# Patient Record
(demographics unavailable — no encounter records)

---

## 2025-01-23 NOTE — HISTORY OF PRESENT ILLNESS
[de-identified] : ISH WATKINS is a 77 year old male here referred by Dr. Burr for evaluation of newly diagnosed Stage II right colon cancer.  Oncology history:  --presentation:  fatigue, change in bowel habits, unintentional weight loss of 5 lbs x 1 month, denies rectal bleeding or pain, saw PCP Dr. Cesar Mercer and found to have Hgb of 7 and received 2 units of RBC transfusion at [place] [facility] --colonoscopy at [facility] on 10/25/2023 : a polypoid non-obstructing large mass was found in the cecum. The mass was partially circumferential (involving two-thirds of the lumen circumference). No bleeding was present. Biopsied. non-bleeding internal hemorrhoids found during retroflexion. Mild hemorrhoids.  Biopsy of cecum mass  showed Invasive moderately differentiated adenocarcinoma. loss of MLH1 and PMS2,  MSH2 and MSH6 intact, HER2 -ve --CT C/A/P at [place] on 10/30/23 showed several small low attenuation liver lesions, too small to characterize, enlarged prostate, large portion of the colon is decompressed. Focal narrowing along the splenic flexure, likely represents peristalsis, infrarenal abdominal aorta w/ atherosclerotic plaque measuring up to 3cm. No distant metastasis  --pre op CEA <1.8 on10/31/23 --seen by Dr. Burr on 11/8/23 and underwent Robotic Right Hemicolectomy 11/16/23, pathology showed invasive adenocarcinoma, moderate to poorly differentiated, all margins negative for carcinoma, 0/35 LN, Foci suspicious for LVI, pT3pN0,  loss of MLH1 and PMS2,  MSH2 and MSH6 intact,  BRAF mutation positive.   PMH:  no significant except for lumbar laminectomy syndrome PSH: spinal surgery with placement of percutaneous spinal cord stimulator 5/14/2021 by Alejandro Tsai, nose surgery in 1969 FH: brother with colon cancer (dx in 50s) SH : non smoker, social alcohol use, lives with his wife, retired.  1/13/25: CT C/A/P- No evidence of recurrence or metastasis.   [de-identified] : Here for follow up. He is feeling well. No changes in bowel movements. He mentioned having 2 BM's a day, which has been ongoing after the surgery. No new changes. He also reported excessive flatulence. He denies nausea, vomiting, constipation, diarrhea, dizziness, or abdominal pain.  Yes (1)

## 2025-01-23 NOTE — ASSESSMENT
[FreeTextEntry1] : Ernie Santamaria is a 77 year old man who presented w/ iron deficiency anemia and change in bowel habits.  Colonoscopy revealed a malignant-appearing mass in the cecum.  Biopsy confirmed adenocarcinoma.  Loss of MLH-1 and PMS2 by IHC;  MSH-2 and MSH-6 intact.  CT c/a/p without any findings for metastatic disease;  pre-op CEA normal at 1.8.  He underwent a robotic assisted R hemicolectomy on 11/16/23.  Pathology showed invasive adenocarcinoma of the cecum measuring 6.6 cm.  Moderate/poorly differentiated.   Lymphovascular invasion identified histologically;  no perineural invasion.  0/35 lymph nodes involved by the cancer.  [id number] (stage IIA).  loss of MLH1 and PMS2,  MSH2 and MSH6 intact,  BRAF mutation positive.   Plan: 1. 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  --prognosis discussed.  This is a very curable cancer. --adjuvant chemotherapy not indicated for his stage and subtype (deficient mismatch repair protein) colon cancer.  Per NCCN guidelines. --BRAF mutation positive, which would indicate a sporadic dMMR cancer, no need for genetic counseling --surveillance schedule for resected stage IIA colon cancer.  He should have labs + examination approximately every 6 months with annual CT c/a/p.  Colonoscopy was due at 1 year (10/2024), now rescheduled for 1/31/25  and then at intervals defined by the findings on the colonoscopy. --labs reviewed, CEA normal reassuring. He is clinically doing well. --Colonoscopy is scheduled for 1/31/25. --CT c/a/p in1/13/25--No recurrence or mets. -- Continue active surveillance   2.  iron deficiency anemia - resolved  --CBC, iron panel, ferritin normal - 79 , %Sat 31%  3. flavio maintenance  --eat well balanced diet, exercise regularly  --maintain f/u with PCP   RTC after 7/23/25 labs- CMP, CBC, CEA

## 2025-01-23 NOTE — REVIEW OF SYSTEMS
[Abdominal Pain] : no abdominal pain [Vomiting] : no vomiting [Constipation] : no constipation [Negative] : Endocrine [de-identified] : mild balance issue, ambulate w/ cane ( not daily)

## 2025-01-23 NOTE — PHYSICAL EXAM
[Fully active, able to carry on all pre-disease performance without restriction] : Status 0 - Fully active, able to carry on all pre-disease performance without restriction [Normal] : affect appropriate [de-identified] : soft, non tender,

## 2025-01-23 NOTE — ASSESSMENT
[FreeTextEntry1] : Ernie Santamaria is a 77 year old man who presented w/ iron deficiency anemia and change in bowel habits.  Colonoscopy revealed a malignant-appearing mass in the cecum.  Biopsy confirmed adenocarcinoma.  Loss of MLH-1 and PMS2 by IHC;  MSH-2 and MSH-6 intact.  CT c/a/p without any findings for metastatic disease;  pre-op CEA normal at 1.8.  He underwent a robotic assisted R hemicolectomy on 11/16/23.  Pathology showed invasive adenocarcinoma of the cecum measuring 6.6 cm.  Moderate/poorly differentiated.   Lymphovascular invasion identified histologically;  no perineural invasion.  0/35 lymph nodes involved by the cancer.  [id number] (stage IIA).  loss of MLH1 and PMS2,  MSH2 and MSH6 intact,  BRAF mutation positive.   Plan: 1. 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  --prognosis discussed.  This is a very curable cancer. --adjuvant chemotherapy not indicated for his stage and subtype (deficient mismatch repair protein) colon cancer.  Per NCCN guidelines. --BRAF mutation positive, which would indicate a sporadic dMMR cancer, no need for genetic counseling --surveillance schedule for resected stage IIA colon cancer.  He should have labs + examination approximately every 6 months with annual CT c/a/p.  Colonoscopy was due at 1 year (10/2024), now rescheduled for 1/31/25  and then at intervals defined by the findings on the colonoscopy. --labs reviewed, CEA normal reassuring. He is clinically doing well. --Colonoscopy is scheduled for 1/31/25. --CT c/a/p in1/13/25--No recurrence or mets. -- Continue active surveillance   2.  iron deficiency anemia - resolved  --CBC, iron panel, ferritin normal - 79 , %Sat 31%  3. flavio maintenance  --eat well balanced diet, exercise regularly  --maintain f/u with PCP   RTC after 7/23/25 labs- CMP, CBC, CEA

## 2025-01-23 NOTE — HISTORY OF PRESENT ILLNESS
[de-identified] : ISH WATKINS is a 77 year old male here referred by Dr. Burr for evaluation of newly diagnosed Stage II right colon cancer.  Oncology history:  --presentation:  fatigue, change in bowel habits, unintentional weight loss of 5 lbs x 1 month, denies rectal bleeding or pain, saw PCP Dr. Cesar Mercer and found to have Hgb of 7 and received 2 units of RBC transfusion at [place] [facility] --colonoscopy at [facility] on 10/25/2023 : a polypoid non-obstructing large mass was found in the cecum. The mass was partially circumferential (involving two-thirds of the lumen circumference). No bleeding was present. Biopsied. non-bleeding internal hemorrhoids found during retroflexion. Mild hemorrhoids.  Biopsy of cecum mass  showed Invasive moderately differentiated adenocarcinoma. loss of MLH1 and PMS2,  MSH2 and MSH6 intact, HER2 -ve --CT C/A/P at [place] on 10/30/23 showed several small low attenuation liver lesions, too small to characterize, enlarged prostate, large portion of the colon is decompressed. Focal narrowing along the splenic flexure, likely represents peristalsis, infrarenal abdominal aorta w/ atherosclerotic plaque measuring up to 3cm. No distant metastasis  --pre op CEA <1.8 on10/31/23 --seen by Dr. Burr on 11/8/23 and underwent Robotic Right Hemicolectomy 11/16/23, pathology showed invasive adenocarcinoma, moderate to poorly differentiated, all margins negative for carcinoma, 0/35 LN, Foci suspicious for LVI, pT3pN0,  loss of MLH1 and PMS2,  MSH2 and MSH6 intact,  BRAF mutation positive.   PMH:  no significant except for lumbar laminectomy syndrome PSH: spinal surgery with placement of percutaneous spinal cord stimulator 5/14/2021 by Alejandro Tsai, nose surgery in 1969 FH: brother with colon cancer (dx in 50s) SH : non smoker, social alcohol use, lives with his wife, retired.  1/13/25: CT C/A/P- No evidence of recurrence or metastasis.   [de-identified] : Here for follow up. He is feeling well. No changes in bowel movements. He mentioned having 2 BM's a day, which has been ongoing after the surgery. No new changes. He also reported excessive flatulence. He denies nausea, vomiting, constipation, diarrhea, dizziness, or abdominal pain.  Plan: Recommend using back applicator to apply moisturizer Detail Level: Detailed

## 2025-01-23 NOTE — PHYSICAL EXAM
[Fully active, able to carry on all pre-disease performance without restriction] : Status 0 - Fully active, able to carry on all pre-disease performance without restriction [Normal] : affect appropriate [de-identified] : soft, non tender,

## 2025-01-23 NOTE — REVIEW OF SYSTEMS
[Abdominal Pain] : no abdominal pain [Vomiting] : no vomiting [Constipation] : no constipation [Negative] : Endocrine [de-identified] : mild balance issue, ambulate w/ cane ( not daily)

## 2025-07-22 NOTE — END OF VISIT
[] : Fellow [FreeTextEntry3] :  I evaluated the patient with the Oncology fellow, Dr Liz Canada.  The patient presents for f/u of stage IIA colon cancer.  No new symptoms or problems to report.  Exam as above per Dr Canada's note Labs reviewed.  CBC, CMP, CEA reassuring.  Plan #  colon cancer - no concerns by history or physical examination at this visit. --discussed CT c/a/p results. No findings for recurrence/metastasis.   --due for colonoscopy which is scheduled at the end of this month --return precautions reviewed.  # enlarged prostate - noted on CT - advised pt to d/w PCP or urologist.  RTC 6 months

## 2025-07-28 NOTE — PHYSICAL EXAM
[Fully active, able to carry on all pre-disease performance without restriction] : Status 0 - Fully active, able to carry on all pre-disease performance without restriction [Normal] : affect appropriate [de-identified] : soft, non tender,

## 2025-07-28 NOTE — ASSESSMENT
[FreeTextEntry1] : Ernie Santamaria is a 78 year old man who presented w/ iron deficiency anemia and change in bowel habits.  Colonoscopy revealed a malignant-appearing mass in the cecum.  Biopsy confirmed adenocarcinoma.  Loss of MLH-1 and PMS2 by IHC;  MSH-2 and MSH-6 intact.  CT c/a/p without any findings for metastatic disease;  pre-op CEA normal at 1.8.  He underwent a robotic assisted R hemicolectomy on 11/16/23.  Pathology showed invasive adenocarcinoma of the cecum measuring 6.6 cm.  Moderate/poorly differentiated.   Lymphovascular invasion identified histologically;  no perineural invasion.  0/35 lymph nodes involved by the cancer.  [id number] (stage IIA).  loss of MLH1 and PMS2, MSH2 and MSH6 intact.  BRAF V600E mutation positive (indicative of sporadic, not germline, mismatch repair deficiency).  No adjuvant therapy.  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 Prognosis was also discussed.  This is a very curable cancer.   Adjuvant chemotherapy was not indicated for his stage and subtype (deficient mismatch repair protein) colon cancer, per the NCCN guidelines.  The patient entered surveillance. --CBC/CMP/CEA reviewed, reassuring. He is clinically doing well, no new symptoms.  --CT c/a/p results in 1/2025. No findings for recurrence/metastasis. We will repeat CT c/a/p in 1/2026. He prefers going to [facility], requisition provided. He will call for scheduling.  --he reports he had a colonoscopy with Dr. Mercer earlier this year and was told that everything was ok. Will request the results.  --return precaution reviewed. Continue active surveillance   # enlarged prostate - noted on CT in 1/2025 --PSA is usually around 2 but annual PSA in June went up to 6 as per pt, he will repeat PSA with PCP next week --has a follow up with urology in September. Advised him to see urology sooner if PSA rising rapidly.   # flavio maintenance  --eat well balanced diet, exercise regularly, good PO hydration  --maintain f/u with PCP   RTC in January after CT c/a/p, please make sure to get CT results from [facility] prior to visit.  labs- CMP, CBC, CEA, B12

## 2025-07-28 NOTE — REVIEW OF SYSTEMS
[Abdominal Pain] : no abdominal pain [Vomiting] : no vomiting [Constipation] : no constipation [Negative] : Endocrine [de-identified] : mild balance issue, ambulate w/ cane ( not daily)

## 2025-07-28 NOTE — ASSESSMENT
[FreeTextEntry1] : Ernie Santamaria is a 78 year old man who presented w/ iron deficiency anemia and change in bowel habits.  Colonoscopy revealed a malignant-appearing mass in the cecum.  Biopsy confirmed adenocarcinoma.  Loss of MLH-1 and PMS2 by IHC;  MSH-2 and MSH-6 intact.  CT c/a/p without any findings for metastatic disease;  pre-op CEA normal at 1.8.  He underwent a robotic assisted R hemicolectomy on 11/16/23.  Pathology showed invasive adenocarcinoma of the cecum measuring 6.6 cm.  Moderate/poorly differentiated.   Lymphovascular invasion identified histologically;  no perineural invasion.  0/35 lymph nodes involved by the cancer.  [id number] (stage IIA).  loss of MLH1 and PMS2, MSH2 and MSH6 intact.  BRAF V600E mutation positive (indicative of sporadic, not germline, mismatch repair deficiency).  No adjuvant therapy.  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 Prognosis was also discussed.  This is a very curable cancer.   Adjuvant chemotherapy was not indicated for his stage and subtype (deficient mismatch repair protein) colon cancer, per the NCCN guidelines.  The patient entered surveillance. --CBC/CMP/CEA reviewed, reassuring. He is clinically doing well, no new symptoms.  --CT c/a/p results in 1/2025. No findings for recurrence/metastasis. We will repeat CT c/a/p in 1/2026. He prefers going to [facility], requisition provided. He will call for scheduling.  --he reports he had a colonoscopy with Dr. Mercer earlier this year and was told that everything was ok. Will request the results.  --return precaution reviewed. Continue active surveillance   # enlarged prostate - noted on CT in 1/2025 --PSA is usually around 2 but annual PSA in June went up to 6 as per pt, he will repeat PSA with PCP next week --has a follow up with urology in September. Advised him to see urology sooner if PSA rising rapidly.   # flavio maintenance  --eat well balanced diet, exercise regularly, good PO hydration  --maintain f/u with PCP   RTC in January after CT c/a/p, please make sure to get CT results from [facility] prior to visit.  labs- CMP, CBC, CEA, B12

## 2025-07-28 NOTE — BEGINNING OF VISIT
[0] : 2) Feeling down, depressed, or hopeless: Not at all (0) [Never] : Never [[id number]] : 0 [Patient/Caregiver not ready to engage] : Patient/Caregiver not ready to engage

## 2025-07-28 NOTE — REVIEW OF SYSTEMS
[Abdominal Pain] : no abdominal pain [Vomiting] : no vomiting [Constipation] : no constipation [Negative] : Endocrine [de-identified] : mild balance issue, ambulate w/ cane ( not daily)

## 2025-07-28 NOTE — BEGINNING OF VISIT
[0] : 2) Feeling down, depressed, or hopeless: Not at all (0) [Never] : Never [[id number]] : 0 [Patient/Caregiver not ready to engage] : Patient/Caregiver not ready to engage

## 2025-07-28 NOTE — ASSESSMENT
[FreeTextEntry1] : Ernie Santamaria is a 78 year old man who presented w/ iron deficiency anemia and change in bowel habits.  Colonoscopy revealed a malignant-appearing mass in the cecum.  Biopsy confirmed adenocarcinoma.  Loss of MLH-1 and PMS2 by IHC;  MSH-2 and MSH-6 intact.  CT c/a/p without any findings for metastatic disease;  pre-op CEA normal at 1.8.  He underwent a robotic assisted R hemicolectomy on 11/16/23.  Pathology showed invasive adenocarcinoma of the cecum measuring 6.6 cm.  Moderate/poorly differentiated.   Lymphovascular invasion identified histologically;  no perineural invasion.  0/35 lymph nodes involved by the cancer.  [id number] (stage IIA).  loss of MLH1 and PMS2, MSH2 and MSH6 intact.  BRAF V600E mutation positive (indicative of sporadic, not germline, mismatch repair deficiency).  No adjuvant therapy.  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 Prognosis was also discussed.  This is a very curable cancer.   Adjuvant chemotherapy was not indicated for his stage and subtype (deficient mismatch repair protein) colon cancer, per the NCCN guidelines.  The patient entered surveillance. --CBC/CMP/CEA reviewed, reassuring. He is clinically doing well, no new symptoms.  --CT c/a/p results in 1/2025. No findings for recurrence/metastasis. We will repeat CT c/a/p in 1/2026. He prefers going to [facility], requisition provided. He will call for scheduling.  --he reports he had a colonoscopy with Dr. Mercer earlier this year and was told that everything was ok. Will request the results.  --return precaution reviewed. Continue active surveillance   # enlarged prostate - noted on CT in 1/2025 --PSA is usually around 2 but annual PSA in June went up to 6 as per pt, he will repeat PSA with PCP next week --has a follow up with urology in September. Advised him to see urology sooner if PSA rising rapidly.   # flavio maintenance  --eat well balanced diet, exercise regularly, good PO hydration  --maintain f/u with PCP   RTC in January after CT c/a/p, please make sure to get CT results from [facility] prior to visit.  labs- CMP, CBC, CEA, B12

## 2025-07-28 NOTE — HISTORY OF PRESENT ILLNESS
[de-identified] : ISH WATKINS is a 78 year old male here for follow up of colon cancer.  Oncology history:  1.  colon cancer (right colon) --presentation:  fatigue, change in bowel habits, unintentional weight loss.  Severe anemia.   --colonoscopy at [facility] on 10/25/2023 : a polypoid non-obstructing, malignant appearing mass in the cecum.  Biopsy of cecum mass:  invasive moderately differentiated adenocarcinoma. loss of MLH1 and PMS2,  MSH2 and MSH6 intact, HER2 negative --CT C/A/P at [place] on 10/30/23 showed several small low attenuation liver lesions, too small to characterize, enlarged prostate, large portion of the colon is decompressed. Focal narrowing along the splenic flexure, likely represents peristalsis, infrarenal abdominal aorta w/ atherosclerotic plaque measuring up to 3cm. No distant metastasis  --pre op CEA <1.8 on10/31/23 --Robotic Right Hemicolectomy (Dr Burr) on 1/16/23.  Pathology:  invasive adenocarcinoma moderate to poorly differentiated.  Margins negative, 0/35 lymph nodes involved w/ cancer.  Foci suspicious for lymphovascular invasion.  No perineural invasion.  [id number] (stage IIA).  loss of MLH1 and PMS2, MSH2 and MSH6 intact,  BRAF V600E positive - indicative for sporadic (not germline) mismatch repair deficiency --no adjuvant therapy.   PMH:  no significant except for lumbar laminectomy syndrome PSH: spinal surgery with placement of percutaneous spinal cord stimulator 5/14/2021, nose surgery in 1969 FH: brother with colon cancer (dx in 50s) SH : non smoker, social alcohol use, lives with his wife, retired. [de-identified] : Here for follow up. He is watching on his diet, has lost weight since last visit, feeling good.   No changes in bowel movements, 2-3 BM's a day, which has been ongoing after the surgery. No new changes.  His PSA is usually around 2 but annual PSA in June went up to 6, he will repeat PSA with PCP next week, he is scheduled to see urology in September. He had a colonoscopy with Dr. Mercer earlier this year and was told that everything was ok.   He denies nausea, vomiting, constipation, diarrhea, dizziness, or abdominal pain. No urinary symptoms.

## 2025-07-28 NOTE — PHYSICAL EXAM
[Fully active, able to carry on all pre-disease performance without restriction] : Status 0 - Fully active, able to carry on all pre-disease performance without restriction [Normal] : affect appropriate [de-identified] : soft, non tender,

## 2025-07-28 NOTE — REVIEW OF SYSTEMS
[Abdominal Pain] : no abdominal pain [Vomiting] : no vomiting [Constipation] : no constipation [Negative] : Endocrine [de-identified] : mild balance issue, ambulate w/ cane ( not daily)

## 2025-07-28 NOTE — PHYSICAL EXAM
[Fully active, able to carry on all pre-disease performance without restriction] : Status 0 - Fully active, able to carry on all pre-disease performance without restriction [Normal] : affect appropriate [de-identified] : soft, non tender,